# Patient Record
Sex: MALE | Race: OTHER | HISPANIC OR LATINO | URBAN - METROPOLITAN AREA
[De-identification: names, ages, dates, MRNs, and addresses within clinical notes are randomized per-mention and may not be internally consistent; named-entity substitution may affect disease eponyms.]

---
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
## 2024-10-08 NOTE — ED PROVIDER NOTE - CONDITION AT DISCHARGE:
Refill Decision Note   Sukumar Emigdio  is requesting a refill authorization.  Brief Assessment and Rationale for Refill:  Approve     Medication Therapy Plan:        Comments:     Note composed:7:10 PM 10/07/2024           
No care due was identified.  [facility] Embedded Care Due Messages. Reference number: [id number].   10/07/2024 9:50:59 AM CDT  
Satisfactory